# Patient Record
Sex: FEMALE | Race: WHITE
[De-identification: names, ages, dates, MRNs, and addresses within clinical notes are randomized per-mention and may not be internally consistent; named-entity substitution may affect disease eponyms.]

---

## 2019-03-16 ENCOUNTER — HOSPITAL ENCOUNTER (EMERGENCY)
Dept: HOSPITAL 25 - UCEAST | Age: 59
Discharge: HOME | End: 2019-03-16
Payer: COMMERCIAL

## 2019-03-16 VITALS — DIASTOLIC BLOOD PRESSURE: 78 MMHG | SYSTOLIC BLOOD PRESSURE: 131 MMHG

## 2019-03-16 DIAGNOSIS — M62.830: ICD-10-CM

## 2019-03-16 DIAGNOSIS — Z88.8: ICD-10-CM

## 2019-03-16 DIAGNOSIS — M54.5: Primary | ICD-10-CM

## 2019-03-16 DIAGNOSIS — M51.36: ICD-10-CM

## 2019-03-16 DIAGNOSIS — Z88.0: ICD-10-CM

## 2019-03-16 DIAGNOSIS — Z91.018: ICD-10-CM

## 2019-03-16 PROCEDURE — 99212 OFFICE O/P EST SF 10 MIN: CPT

## 2019-03-16 PROCEDURE — 72110 X-RAY EXAM L-2 SPINE 4/>VWS: CPT

## 2019-03-16 PROCEDURE — 81003 URINALYSIS AUTO W/O SCOPE: CPT

## 2019-03-16 PROCEDURE — G0463 HOSPITAL OUTPT CLINIC VISIT: HCPCS

## 2019-03-16 PROCEDURE — 87086 URINE CULTURE/COLONY COUNT: CPT

## 2019-03-16 NOTE — UC
Back Pain HPI





- HPI Summary


HPI Summary: 


Patient Chief Complaint:





Course (sudden, gradual, changing, aggravating, relieving, current condition, 

severity):





Pain:





MD note: vital signs stable.


Vital signs beyond normal range reviewed.





Nurses Note Reviewed. 





Visit History Reviewed.  Noncontributory to present complaint.


Medications & Allergies Reviewed.  











- History of Current Complaint


Stated Complaint: BACK PAIN


Time Seen by Provider: 03/16/19 10:05





- Allergies/Home Medications


Allergies/Adverse Reactions: 


 Allergies











Allergy/AdvReac Type Severity Reaction Status Date / Time


 


MS Aspirin Allergy Severe facial Verified 10/17/16 07:32





[From Safia-Cypress Plus Cold   swelling,  





Medicine]   angioedema  


 


MS Chlorpheniramine Allergy Severe facial Verified 10/17/16 07:32





[From Safia-Cypress Plus Cold   swelling,  





Medicine]   angioedema  


 


MS Phenylpropanolamine Allergy Severe facial Verified 10/17/16 07:32





[From Safia-Cypress Plus Cold   swelling,  





Medicine]   angioedema  


 


MS Kiwi Extract Allergy Mild BOILS Verified 10/17/16 07:32





[Kiwi Extract]     


 


MS [place]field Flavor Allergy Mild Blisters Verified 10/17/16 07:32





[Peach Flavor]     


 


MS Amoxicillin [Amoxicillin] Allergy  facial Verified 10/17/16 07:32





   swelling,  





   angioedema  














PMH/Surg Hx/FS Hx/Imm Hx





- Additional Past Medical History


Additional PMH: 





PMH reviewed.





Family History:  


   Positive history of:


   -HYPTERTENSION


   -CARDIOVASCULAR DISEASE 


   -STROKE


   -DIABETES


   -CANCER


-Denies hypertension, heart disease, stroke, diabetes, cancer.





SOCIAL HISTORY:


   Employment:


   Family Environment:


   Habits:








- Surgical History


Surgical History: Yes


Surgery Procedure, Year, and Place: LEFT EYE REPAIR.  LEFT ANKLE FX W/ REPAIR 

2004





- Family History


Known Family History: Positive: None, Hypertension, Diabetes





- Social History


Alcohol Use: Daily


Substance Use Type: None


Smoking Status (MU): Never Smoked Tobacco


Have You Smoked in the Last Year: No





Physical Exam





- Summary


Physical Exam Summary: 





Appearance: The patient is well-appearing, is in no pain or distress, and is 

well-nourished.


Eyes: Conjunctiva are clear.  Pupils are equal and reactive to light and 

accommodation.  Extra ocular muscle movement is intact.


ENT: The hearing is grossly normal, the pharynx is normal, and the TMs are 

normal. There is no muffled or hoarse voice.  No stridor.


Neck: The neck is supple and there is no lymphadenopathy.


Respiratory: The chest is nontender to palpation and without crepitus. The 

lungs are clear, there are normal breath sounds, and there is no respiratory 

distress.  No wheezes, rales or rhonchi.


Cardiovascular: Heart sounds reveal a regular rate and rhythm. There are no 

clicks, rubs or murmurs.  There are no carotid bruits or thrills.  Circulation 

is grossly intact.


Abdomen: The abdomen is soft and nontender. There is no organomegaly.  Bowel 

sounds are present and within normal limits.  No point tenderness at McBurneys 

point.


Musculoskeletal: Strength is intact. The patient moves all extremities.  


Neurological: The patient is alert. Motor and sensory are  examination grossly 

intact.  Speech is normal.


Psychological: The patient displays age appropriate behavior


Skin: Negative for rashes. 








Discharge





- Sign-Out/Discharge


Documenting (check all that apply): Patient Departure





- Discharge Plan


Condition: Stable


Disposition: HOME


Referrals: 


Kristian Thompson MD [Primary Care Provider] - 


Additional Instructions: 


AS WE DISCUSSED: 


 


PLEASE SEEK CARE AT THE EMERGENCY DEPARTMENT IF SYMPTOMS WORSEN OR IF NEW 

SYMPTOMS DEVELOP.  





FOLLOW UP WITH YOUR PRIMARY CARE PHYSICIAN IF CONDITION CONTINUES BEYOND 3 DAYS 

WITHOUT IMPROVEMENT.





We are open from 7 a.m. to 10 p.m.  Call us with any questions or concerns.





YOUR DIAGNOSIS IS:





YOUR PRESCRIPTION RECOMMENDATION IS:





OTHER INSTRUCTIONS:





Hypertension Discharge Instructions:





Your blood pressure reading today was 160/66, indicating HYPERTENSION. Follow-

up with your primary care provider within 4 weeks for blood pressure check and 

appropriate recommendations and treatment, as needed. 








For pain:  Ibuprofen (Motrin and other brand names) 400-600mg PLUS 

acetaminophen (Tylenol and other brand names) 500mg - 1000mg every 8 hours. 

Maximum is 3 doses a day. If this dosage is required for more than 5 days, you 

should re-check with your doctor.  The combination of these two over-the-

counter medications can be more effective than each one taken alone.  Please 

check with the pharmacist if you have questions about your allergies to these 

medications.





To help you sleep: If you feel as if you want to calm down and get sleepy, over 

the counter diphenhydramine (Benadryl and other brand names), 25 mg up to every 

8 hours may be useful.  Please check with the pharmacist if you have questions 

about your allergies to these medications.  Please check with the pharmacist if 

you have questions about your allergies to these medications.








MEDICATIONS REVIEWED:  


Medications have been included in the original chart and reviewed.


HYPERTENSION STATUS REVIEWED.





HTN NOTED:


Hypertensive BP reading of X; follow up with PCP within 4 weeks to recheck 

blood pressure has been recommended to patient.  Patient voices understanding.


CURRENT HTN TREATMENT:


Elevated BP but has current hypertension diagnosis and treatment.  Patient 

maintains compliance with medication.  Referred to PCP to evaluate current 

medication.  Patient voices understanding. 


HTN URGENT/EMERGENT:


Patient is Urgent/Emergent. BP elevated due to current condition w/o HTN in 

PMH.  Patient will recheck blood pressure over the next 3 months, and follow up 

with PCP if blood pressure is above 120/80.








- Billing Disposition and Condition


Condition: STABLE


Disposition: Home

## 2019-03-16 NOTE — UC
Back Pain HPI





- HPI Summary


HPI Summary: 


 59 y/o female presents to the urgent care accompany by  c/o lower back 

pain for the past 1.5 weeks. Pt reports she has had lower back pain in the past

, for which she usually does core exercise to strengthen her back muscle. 

However this time she doesn't recall any heavy lifting or injury. Pain has 

worse since yesterday.  Pain is 6/10 w/ walking, standing up radiating to her 

both hips.  She took flexeril last night she had it from last year. This 

morning when she woke up, it was very painful to get up out of be, She took an 

Oxycodone she had at home. Pt is allergic to NSAIDs. Pt denies saddle anesthesia

, fecal or urinary incontinence, urinary symptoms, fever, numbness or tingling 

sensation over the lower extremities, SOB, chest pain, abdominal pain, N/V/d. 





- History of Current Complaint


Chief Complaint: UCBackPain


Stated Complaint: BACK PAIN


Time Seen by Provider: 03/16/19 10:05


Hx Obtained From: Patient


Onset/Duration: Gradual Onset, Lasting Weeks - 1 week, Still Present, Worse 

Since - 1 day


Timing: Constant


Severity Initially: Mild


Severity Currently: Moderate


Pain Intensity: 7


Pain Scale Used: 0-10 Numeric


Back Pain: Is Discrete @ - lower back pain, Radiates To - b/L hips


Character: Sharp - w/ stading and sitting, Spasmodic


Aggravating Factor(s): Movement, Lifting, Bending, Walking, Cough


Alleviating Factor(s): Rest, OTC Meds - flexeril and oxycodone


Associated Signs And Symptoms: Positive: Negative.  Negative: Swelling, Redness

, Bruising, Fever, Weakness, Numbness, Abdominal Pain, Flank Pain, Bladder 

Incontinence, Bowel Incontinence, Weight Loss, Pain with Weight Bearing





- Risk Factors


AAA Risk Factors: Negative


TAD Risk Factors: Negative


Cauda Equina Risk Factors: Negative


Epidural Abscess Risk Factors: Negative





- Allergies/Home Medications


Allergies/Adverse Reactions: 


 Allergies











Allergy/AdvReac Type Severity Reaction Status Date / Time


 


amoxicillin Allergy  facial Verified 03/16/19 10:43





   swelling  


 


NSAIDS (Non-Steroidal Allergy  facial Verified 03/16/19 10:44





Anti-Inflamma   swelling  


 


marilee seltzer Allergy  facial Uncoded 03/16/19 10:42





   swelling  


 


kiwi extract Allergy  Blisters Uncoded 03/16/19 11:34


 


peach flavoring Allergy  boils Uncoded 03/16/19 11:34














PMH/Surg Hx/FS Hx/Imm Hx





- Additional Past Medical History


Additional PMH: 





PMH reviewed.





Family History:  


   Positive history of:


   -HYPTERTENSION


   -CARDIOVASCULAR DISEASE 


   -STROKE


   -DIABETES


   -CANCER


-Denies hypertension, heart disease, stroke, diabetes, cancer.





SOCIAL HISTORY:


   Employment:


   Family Environment:


   Habits:





Previously Healthy: Yes - Pt denies PMHX





- Surgical History


Surgical History: Yes


Surgery Procedure, Year, and Place: LEFT EYE REPAIR.  LEFT ANKLE FX W/ REPAIR 

2004





- Family History


Known Family History: Positive: Hypertension, Diabetes





- Social History


Alcohol Use: Daily


Substance Use Type: None


Smoking Status (MU): Never Smoked Tobacco


Have You Smoked in the Last Year: No





Review of Systems


All Other Systems Reviewed And Are Negative: Yes


Constitutional: Positive: Negative


Skin: Positive: Negative


Eyes: Positive: Negative


ENT: Positive: Negative


Respiratory: Positive: Negative


Cardiovascular: Positive: Negative


Gastrointestinal: Positive: Negative


Genitourinary: Positive: Negative


Motor: Positive: Negative


Neurovascular: Positive: Negative


Musculoskeletal: Positive: Decreased ROM - lower back, Other: - lower back pain 

radiating to both hips.


Neurological: Positive: Negative


Psychological: Positive: Negative


Is Patient Immunocompromised?: No





Physical Exam





- Summary


Physical Exam Summary: 





Vital Signs Reviewed: Yes


Appearance: Well-Appearing, Well-Nourished, tall female sitting in the 

examining table w/o any apparent pain distress. 


Eyes: Positive: Conjunctiva Clear - PERRLA, EOMI.


ENT: Positive: Normal ENT inspection, Hearing grossly normal, Pharynx normal, 

TMs normal, Uvula midline


Neck: Positive: Supple, Nontender, No Lymphadenopathy


Respiratory: Positive: Chest non-tender, Lungs clear, Normal breath sounds, No 

respiratory distress


Cardiovascular: Positive: RRR, No Murmur, Pulses Normal, Brisk Capillary Refill


Abdomen Description: Positive: Nontender, No Organomegaly, Soft.  Negative: CVA 

Tenderness (R), CVA Tenderness (L)


Bowel Sounds: Positive: Present


Musculoskeletal: Positive: Strength Intact,   BACK: Patient walked into the 

urgent care room with symmetric ambulation, No signs of limping, antalgic, able 

to bear weight. No signs of trauma, No masses palpated. Point tenderness at the 

level of L5-S1 w/ B/L paraspinal muscle tenderness at the same level. No CVAT, 

no flank ecchymosis . No sacroiliac notch tenderness, No saddle anesthesia.ROM: 

limited due to pain, Straight Leg Raise: negative. Patellar reflexes: brisk, 

symmetric Muscle strength lower extremities. Dorsiflexion/ plantar flexion of 

ankles. Heel/ toe walk. Lower extremities: Femoral, popliteal, posterior tibial

, and dorsalis pedis pulses WNL. Pt refuse rectal exam


Neurological: Positive: Alert, Muscle Tone Normal


Psychological Exam: Normal


Skin Exam: Normal





Triage Information Reviewed: Yes


Vital Signs: 


 Initial Vital Signs











Temp  97.3 F   03/16/19 10:39


 


Pulse  65   03/16/19 10:39


 


Resp  16   03/16/19 10:39


 


BP  124/64   03/16/19 10:39


 


Pulse Ox  100   03/16/19 10:39














Back Pain Course/Dx





- Course


Course Of Treatment: 


 59 y/o female presents to the urgent care accompany by  c/o lower back 

pain for the past 1.5 weeks. Pt reports she has had lower back pain in the past

, for which she usually does core exercise to strengthen her back muscle. 

However this time she doesn't recall any heavy lifting or injury. Pain has 

worse since yesterday.  Pain is 6/10 w/ walking, standing up radiating to her 

both hips.  She took flexeril last night she had it from last year. This 

morning when she woke up, it was very painful to get up out of be, She took an 

Oxycodone she had at home. Pt is allergic to NSAIDs. Pt denies saddle anesthesia

, fecal or urinary incontinence, urinary symptoms, fever, numbness or tingling 

sensation over the lower extremities, SOB, chest pain, abdominal pain, N/V/d. 

Hx obtained.  UA: trace of leukoesterases. PE: Point tenderness at the level of 

the L5-S1 and b/L  paraspinal muscle spasm at the same level on examination.  

Lumbosacral X-ray ordered, Impression: 1. Moderate L4-L5 degenerative disc 

disease. 2. Moderate facet arthropathy in the lower lumbar spine. 3. Moderate 

bilateral sacroiliac osteoarthropathy. Pt  Prednisone PO taper dose, Flexeril 

PO. also advised to continue taking Tyelnol or Oxycodone for pain.  Patient was 

instructed to  the f/u Spinal Navigator Carola Orosco if not improvement of 

symptoms. Patient understands and agrees. Patient is able to ambulate freely w/

o aid or limp. Plan of care was discussed with the patient and patient 

understands and agrees. All questions were answered at patient satisfaction.  

Pt left clinic hemodynamically stable.








- Differential Dx/Diagnosis


Differential Diagnosis/HQI/PQRI: Arthritis, Compressive Cord Syndrome, Fracture

, Herniated Disc, Osteoporosis, Renal Colic, Strain, Sprain


Provider Diagnosis: 


 Lower back pain, Degenerative disc disease, lumbar, Muscle spasm of back








Discharge





- Sign-Out/Discharge


Documenting (check all that apply): Patient Departure - d/C home


All imaging exams completed and their final reports reviewed: Yes





- Discharge Plan


Condition: Stable


Disposition: HOME


Prescriptions: 


Cyclobenzaprine TAB* [Flexeril 10 MG TAB*] 10 mg PO TID PRN #21 tab


 PRN Reason: Spasms - Muscle


methylPREDNISolone [Medrol Dosepak 4 MG*] 4 mg PO .SEE GEORGETTE INSTRUCTION #1 georgette


Patient Education Materials:  Low Back Strain (ED), Degenerative Disc Disease (

ED)


Forms:  *Work Release


Referrals: 


Kristian Thompson MD [Primary Care Provider] - 1 Week


Additional Instructions: 


1- Please takeTylenol PO  or Oxycodone you have at home as directed after meals 

for pain. Take the  Medrol dose georgette as directed to alleviate symptoms


2- Take Flexeril PO as directed for muscle spasm. Please do not drive while 

taking the medication. 


3- Avoid strenuous exercise or heavy lifting. wear a back support 


4- Please call Spinal  Nurse Navigator: Elidia Orosco: [phone] if not 

improvement of symptoms  for further management of your Degenerative disc 

disease and back arthropathy














- Billing Disposition and Condition


Condition: STABLE


Disposition: Home

## 2019-03-17 NOTE — UC
- Progress Note


Progress Note: 





3/17/2019


Urine culture: no growth


No change


Anai Nunn PA-C





Course/Dx





- Diagnoses


Provider Diagnoses: 


 Lower back pain, Degenerative disc disease, lumbar, Muscle spasm of back








Discharge





- Sign-Out/Discharge


Documenting (check all that apply): Patient Departure - d/C home


All imaging exams completed and their final reports reviewed: Yes





- Discharge Plan


Condition: Stable


Disposition: HOME


Prescriptions: 


Cyclobenzaprine TAB* [Flexeril 10 MG TAB*] 10 mg PO TID PRN #21 tab


 PRN Reason: Spasms - Muscle


methylPREDNISolone [Medrol Dosepak 4 MG*] 4 mg PO .SEE GEORGETTE INSTRUCTION #1 georgette


Patient Education Materials:  Low Back Strain (ED), Degenerative Disc Disease (

ED)


Forms:  *Work Release


Referrals: 


Kristian Thompson MD [Primary Care Provider] - 1 Week


Additional Instructions: 


1- Please takeTylenol PO  or Oxycodone you have at home as directed after meals 

for pain. Take the  Medrol dose georgette as directed to alleviate symptoms


2- Take Flexeril PO as directed for muscle spasm. Please do not drive while 

taking the medication. 


3- Avoid strenuous exercise or heavy lifting. wear a back support 


4- Please call Spinal  Nurse Navigator: Elidia Orosco: [phone] if not 

improvement of symptoms  for further management of your Degenerative disc 

disease and back arthropathy














- Billing Disposition and Condition


Condition: STABLE


Disposition: Home